# Patient Record
Sex: FEMALE | Race: WHITE
[De-identification: names, ages, dates, MRNs, and addresses within clinical notes are randomized per-mention and may not be internally consistent; named-entity substitution may affect disease eponyms.]

---

## 2018-03-24 ENCOUNTER — HOSPITAL ENCOUNTER (EMERGENCY)
Dept: HOSPITAL 41 - JD.ED | Age: 38
Discharge: HOME | End: 2018-03-24
Payer: COMMERCIAL

## 2018-03-24 VITALS — SYSTOLIC BLOOD PRESSURE: 126 MMHG | DIASTOLIC BLOOD PRESSURE: 79 MMHG

## 2018-03-24 DIAGNOSIS — G43.909: Primary | ICD-10-CM

## 2018-03-24 DIAGNOSIS — Z88.5: ICD-10-CM

## 2018-03-24 PROCEDURE — 99284 EMERGENCY DEPT VISIT MOD MDM: CPT

## 2018-03-24 PROCEDURE — 96375 TX/PRO/DX INJ NEW DRUG ADDON: CPT

## 2018-03-24 PROCEDURE — 96374 THER/PROPH/DIAG INJ IV PUSH: CPT

## 2018-03-24 NOTE — EDM.PDOC
ED HPI GENERAL MEDICAL PROBLEM





- General


Chief Complaint: Headache


Stated Complaint: MIGRAINE x 3 DAYS


Time Seen by Provider: 18 11:03


Source of Information: Reports: Patient, Old Records (previous migraine ER 

visits.)


History Limitations: Reports: No Limitations





- History of Present Illness


INITIAL COMMENTS - FREE TEXT/NARRATIVE: 





37-year-old female presents for evaluation treatment of a migraine headache. 

Patient reports the migraine has been going on for the last 3 days. States that 

this is a typical migraine for her. She does struggle with migraines and has 

done so for the last few years. Reports pain on the right side of her head 

behind her right eye. Reports associated symptoms of nausea, vomiting, 

photophobia and phonophobia. Reports some neck discomfort associated with the 

migraine but no neck pain. No fevers. Denies any cough or cold symptoms such as 

sore throat, ear pain, sinus pressure or cough.





Patient states that she has tried Tylenol, Advil and Tylenol PM without any 

relief. States that she has been on multiple medications in the past including 

imitrex, Fioricet and others. She is not on anything currently for her 

migraines. She did see her primary care provider, Dr. Cameron, this week. Plan is 

to proceed with a referral to neurology for further evaluation. She has seen 

neurology in the past when she lived in [facility].





Currently rates the pain as a 9-10 out of 10.





Patient reports an centimeter hysterectomy about 5 years ago the migraines did 

seem to improve slightly.


Duration: Day(s): (3)


Location: Reports: Head


Quality: Reports: Same as Previous Episode


Treatments PTA: Reports: Acetaminophen, NSAIDS


  ** Headache


Pain Score (Numeric/FACES): 9





- Related Data


 Allergies











Allergy/AdvReac Type Severity Reaction Status Date / Time


 


hydrocodone [From Vicodin] Allergy  Nausea Verified 02/10/17 08:25











Home Meds: 


 Home Meds





. [No Known Home Meds]  18 [History]











Past Medical History





- Past Health History


Medical/Surgical History: Denies Medical/Surgical History


Neurological History: Reports: Migraines





- Past Surgical History


Female  Surgical History: Reports:  Section, Hysterectomy, Other (See 

Below)





Social & Family History





- Family History


Family Medical History: Noncontributory





- Tobacco Use


Smoking Status *Q: Never Smoker


Years of Tobacco use: 15


Second Hand Smoke Exposure: No





- Caffeine Use


Caffeine Use: Reports: Soda





- Alcohol Use


Days Per Week of Alcohol Use: 0





- Recreational Drug Use


Recreational Drug Use: No





- Living Situation & Occupation


Living situation: Reports: 


Occupation: Employed





ED ROS GENERAL





- Review of Systems


Review Of Systems: See Below


Constitutional: Denies: Fever


HEENT: Reports: Other (reports photophobia and phonophobia).  Denies: Ear Pain, 

Throat Pain


Respiratory: Denies: Cough


GI/Abdominal: Reports: Nausea, Vomiting


Musculoskeletal: Denies: Neck Pain


Neurological: Reports: Headache





- Physical Exam


Exam: See Below


Exam Limited By: No Limitations


General Appearance: Alert, WD/WN, Moderate Distress


Eye Exam: Bilateral Eye: Normal Inspection, PERRL


Ears: Normal External Exam, Normal Canal, Hearing Grossly Normal, Normal TMs


Nose: Normal Inspection


Throat/Mouth: Normal Inspection, Normal Lips, Normal Voice, No Airway Compromise


Neck: Normal Inspection


Respiratory/Chest: No Respiratory Distress, Lungs Clear, Normal Breath Sounds


Cardiovascular: Normal Peripheral Pulses, Regular Rate, Rhythm, No Murmur


Neuro Exam (Abbreviated): Alert, Oriented, Normal Cognition


Psychiatric: Normal Affect, Normal Mood


Skin Exam: Warm, Dry, Normal Color





Course





- Vital Signs


Last Recorded V/S: 


 Last Vital Signs











Temp  37.3 C   18 10:22


 


Pulse  82   18 10:22


 


Resp  18   18 10:22


 


BP  126/79   18 10:22


 


Pulse Ox  98   18 10:22














- Orders/Labs/Meds


Orders: 


 Active Orders 24 hr











 Category Date Time Status


 


 Peripheral IV Care [RC] .AS DIRECTED Care  18 11:12 Active


 


 Peripheral IV Insertion Adult [OM.PC] Routine Oth  18 11:11 Ordered











Meds: 


Medications














Discontinued Medications














Generic Name Dose Route Start Last Admin





  Trade Name Freq  PRN Reason Stop Dose Admin


 


Diphenhydramine HCl  50 mg  18 11:12  18 11:44





  Benadryl  IVPUSH  18 11:13  50 mg





  ONETIME ONE   Administration


 


Ketorolac Tromethamine  30 mg  18 11:12  18 11:45





  Toradol  IVPUSH  18 11:13  30 mg





  ONETIME ONE   Administration


 


Prochlorperazine Edisylate  10 mg  18 11:12  18 11:41





  Compazine  IVPUSH  18 11:13  10 mg





  ONETIME ONE   Administration


 


Sodium Chloride  10 ml  18 11:12  18 11:47





  Saline Flush  FLUSH   10 ml





  ASDIRECTED PRN   Administration





  Keep Vein Open   














- Re-Assessments/Exams


Free Text/Narrative Re-Assessment/Exam: 





18 12:57


I checked on the patient. Migraine is nearly resolved. She feels comfortable 

going home.





Will discharge home at this time. Discharge instructions as documented.








Departure





- Departure


Time of Disposition: 12:58


Disposition: Home, Self-Care 01


Condition: Fair


Clinical Impression: 


 Migraine








- Discharge Information


Instructions:  Migraine Headache


Referrals: 


Ameena Cameron MD [Primary Care Provider] - 


Forms:  ED Department Discharge


Additional Instructions: 


Go home and rest in a dark quiet room.





Make sure you are drinking plenty of fluids





Continue with your current plan of care; recommend seeing neurology as planned.





Please return to the ER if your symptoms change or worsen.





- My Orders


Last 24 Hours: 


My Active Orders





18 11:11


Peripheral IV Insertion Adult [OM.PC] Routine 





18 11:12


Peripheral IV Care [RC] .AS DIRECTED 














- Assessment/Plan


Last 24 Hours: 


My Active Orders





18 11:11


Peripheral IV Insertion Adult [OM.PC] Routine 





18 11:12


Peripheral IV Care [RC] .AS DIRECTED

## 2023-10-18 ENCOUNTER — HOSPITAL ENCOUNTER (OUTPATIENT)
Dept: HOSPITAL 41 - JD.SDS | Age: 43
Discharge: HOME | End: 2023-10-18
Attending: ORTHOPAEDIC SURGERY
Payer: COMMERCIAL

## 2023-10-18 VITALS — SYSTOLIC BLOOD PRESSURE: 91 MMHG | HEART RATE: 54 BPM | DIASTOLIC BLOOD PRESSURE: 68 MMHG

## 2023-10-18 DIAGNOSIS — Z88.5: ICD-10-CM

## 2023-10-18 DIAGNOSIS — G56.03: Primary | ICD-10-CM

## 2023-10-18 DIAGNOSIS — G43.909: ICD-10-CM

## 2023-10-18 DIAGNOSIS — Z87.891: ICD-10-CM

## 2023-10-18 DIAGNOSIS — F32.A: ICD-10-CM

## 2023-10-18 DIAGNOSIS — H61.23: ICD-10-CM

## 2023-10-18 DIAGNOSIS — Z79.899: ICD-10-CM

## 2023-10-18 DIAGNOSIS — E66.9: ICD-10-CM

## 2023-10-18 DIAGNOSIS — F41.9: ICD-10-CM

## 2023-10-18 PROCEDURE — 20526 THER INJECTION CARP TUNNEL: CPT

## 2023-10-18 PROCEDURE — 64721 CARPAL TUNNEL SURGERY: CPT

## 2024-10-24 ENCOUNTER — HOSPITAL ENCOUNTER (OUTPATIENT)
Dept: HOSPITAL 41 - JD.SDS | Age: 44
Discharge: HOME | End: 2024-10-24
Attending: ORTHOPAEDIC SURGERY
Payer: COMMERCIAL

## 2024-10-24 VITALS — SYSTOLIC BLOOD PRESSURE: 101 MMHG | HEART RATE: 77 BPM | DIASTOLIC BLOOD PRESSURE: 60 MMHG

## 2024-10-24 DIAGNOSIS — Z79.899: ICD-10-CM

## 2024-10-24 DIAGNOSIS — E66.9: ICD-10-CM

## 2024-10-24 DIAGNOSIS — Z88.5: ICD-10-CM

## 2024-10-24 DIAGNOSIS — G56.02: Primary | ICD-10-CM

## 2024-10-24 PROCEDURE — 64721 CARPAL TUNNEL SURGERY: CPT
